# Patient Record
(demographics unavailable — no encounter records)

---

## 2025-02-01 NOTE — DISCUSSION/SUMMARY
[FreeTextEntry1] : Pt brought in due to attention concerns  school brought up that he has inattention, trouble completing tasks mom notices this at home as well vanderbuilts given for mom and teacher to complete ref to Neuro f/u prn

## 2025-02-11 NOTE — ASSESSMENT
[FreeTextEntry1] :  ES is a 10 year old male presenting for initial evaluation of inattention/hyperactivity   ES is in a general education 5th grade classroom setting with no services. Academically, borderline in all subjects. Concerns with inattention, need for prompting and fidgeting. Concerns for staring episodes. Non focal neurological exam. [place] forms reviewed, consistent with ADHD- Inattentive type. Will order EEG r/o seizures due to staring episodes.

## 2025-02-11 NOTE — PHYSICAL EXAM
[Well-appearing] : well-appearing [Normocephalic] : normocephalic [No dysmorphic facial features] : no dysmorphic facial features [Neck supple] : neck supple [Straight] : straight [No deformities] : no deformities [Alert] : alert [Well related, good eye contact] : well related, good eye contact [Conversant] : conversant [Normal speech and language] : normal speech and language [Follows instructions well] : follows instructions well [VFF] : VFF [Pupils reactive to light and accommodation] : pupils reactive to light and accommodation [Full extraocular movements] : full extraocular movements [Normal facial sensation to light touch] : normal facial sensation to light touch [No facial asymmetry or weakness] : no facial asymmetry or weakness [Gross hearing intact] : gross hearing intact [Equal palate elevation] : equal palate elevation [Good shoulder shrug] : good shoulder shrug [Normal tongue movement] : normal tongue movement [Midline tongue, no fasciculations] : midline tongue, no fasciculations [Normal axial and appendicular muscle tone] : normal axial and appendicular muscle tone [Gets up on table without difficulty] : gets up on table without difficulty [No pronator drift] : no pronator drift [Normal finger tapping and fine finger movements] : normal finger tapping and fine finger movements [No abnormal involuntary movements] : no abnormal involuntary movements [5/5 strength in proximal and distal muscles of arms and legs] : 5/5 strength in proximal and distal muscles of arms and legs [Walks and runs well] : walks and runs well [Able to do deep knee bend] : able to do deep knee bend Opt out [Able to walk on heels] : able to walk on heels [Able to walk on toes] : able to walk on toes [Knee jerks] : knee jerks [Localizes LT and temperature] : localizes LT and temperature [No dysmetria on FTNT] : no dysmetria on FTNT [Good walking balance] : good walking balance [Normal gait] : normal gait [Able to tandem well] : able to tandem well [Negative Romberg] : negative Romberg [de-identified] : Breathing even and unlabored

## 2025-02-11 NOTE — ASSESSMENT
[FreeTextEntry1] :  ES is a 10 year old male presenting for initial evaluation of inattention/hyperactivity   ES is in a general education 5th grade classroom setting with no services. Academically, borderline in all subjects. Concerns with inattention, need for prompting and fidgeting. Concerns for staring episodes. Non focal neurological exam. [place] forms reviewed, consistent with ADHD- Inattentive type. Will order EEG r/o seizures due to staring episodes.

## 2025-02-11 NOTE — HISTORY OF PRESENT ILLNESS
[FreeTextEntry1] : ES is a 10 year old male here for initial evaluation of inattention.   According to [facility], Es is forgetful, struggles to concentrate and has lack of focus. Es needs a lot of prompting and redirection to get work started or completed at school. He struggles to remain seated and when he sits he is constantly fidgeting. Academically, he is borderline in all subjects. No extra help services at this time. Es states at times he gets antsy when sitting an extended period of time.   Educational assessment:  Current Grade: 5th grade Current District: [facility]/ Current Accommodations/ICT: General education classroom setting, no services   Home assessment: He cannot get ready on his own, if he does, it takes him very long, [facility] notes " he is not very self efficient." He needs to be guided through his whole routine. He fidgets at meals at times but he can sit during meals. He can do homework independently, but he will engage in task avoidance or make excuses to leave the table and needs to be checked on. He needs reminders while doing tasks, single step works best. He will lose things often, will forget to bring his homework home or materials needed. Normal relationship with his siblings at home, he is the oldest. His younger siblings are 1 and 2 years old. Socially, he makes friends at school, no concerns. Es enjoys playing tag or doing races with friends at school. He enjoys watching tv or playing games during free time. No concern for anxiety, depression, OCD. He is starting to become oppositional at home with grandparents. He will actively refuse to do things. No hitting or confrontational behavior. Bedtime: 9:30pm, he may still be up when mom comes home from work at 11:30, will pretend to be asleep.Staring episodes at school and home.    [employer] Forms Score Parent: ADD 8/9- (6/9) Hyperactivity 5/9- (6/9) ODD 0/8 - (4/8) Conduct Disorder 0/14 (3/14) Anxiety/depression- 0/7- (3/7)  Performance AVG:   Teacher:  ADD 6/9- (6/9) Hyperactivity 1/9- (6/9) ODD/ Conduct Disorder 0/10 - (4/10) Anxiety/depression- 0/7- (3/7)  Performance Avg 3, 1 in somewhat of a problem, one in problematic

## 2025-02-11 NOTE — HISTORY OF PRESENT ILLNESS
[FreeTextEntry1] : ES is a 10 year old male here for initial evaluation of inattention.   According to [facility], Es is forgetful, struggles to concentrate and has lack of focus. Es needs a lot of prompting and redirection to get work started or completed at school. He struggles to remain seated and when he sits he is constantly fidgeting. Academically, he is borderline in all subjects. No extra help services at this time. Es states at times he gets antsy when sitting an extended period of time.   Educational assessment:  Current Grade: 5th grade Current District: [facility]/ Current Accommodations/ICT: General education classroom setting, no services   Home assessment: He cannot get ready on his own, if he does, it takes him very long, [facility] notes " he is not very self efficient." He needs to be guided through his whole routine. He fidgets at meals at times but he can sit during meals. He can do homework independently, but he will engage in task avoidance or make excuses to leave the table and needs to be checked on. He needs reminders while doing tasks, single step works best. He will lose things often, will forget to bring his homework home or materials needed. Normal relationship with his siblings at home, he is the oldest. His younger siblings are 1 and 2 years old. Socially, he makes friends at school, no concerns. Es enjoys playing tag or doing races with friends at school. He enjoys watching tv or playing games during free time. No concern for anxiety, depression, OCD. He is starting to become oppositional at home with grandparents. He will actively refuse to do things. No hitting or confrontational behavior. Bedtime: 9:30pm, he may still be up when mom comes home from work at 11:30, will pretend to be asleep.Staring episodes at school and home.    [employer] Forms Score Parent: ADD 8/9- (6/9) Hyperactivity 5/9- (6/9) ODD 0/8 - (4/8) Conduct Disorder 0/14 (3/14) Anxiety/depression- 0/7- (3/7)  Performance AVG:   Teacher:  ADD 6/9- (6/9) Hyperactivity 1/9- (6/9) ODD/ Conduct Disorder 0/10 - (4/10) Anxiety/depression- 0/7- (3/7)  Performance Avg 3, 1 in somewhat of a problem, one in problematic

## 2025-02-11 NOTE — BIRTH HISTORY
[At Term] : at term [place] [Normal Vaginal Route] : by normal vaginal route [None] : there were no delivery complications [Age Appropriate] : age appropriate developmental milestones met [de-identified] : 37 weeks [FreeTextEntry3] : walked at 14 months

## 2025-02-11 NOTE — BIRTH HISTORY
[At Term] : at term [place] [Normal Vaginal Route] : by normal vaginal route [None] : there were no delivery complications [Age Appropriate] : age appropriate developmental milestones met [de-identified] : 37 weeks [FreeTextEntry3] : walked at 14 months

## 2025-02-11 NOTE — PLAN
[FreeTextEntry1] : [ ] Accommodations letter provided  [ ] Discussed use of Omega 3 fish oil [ ] Routine EEg r/o seizures [ ]Follow up in 6 months, call for result of EEG once completed

## 2025-02-11 NOTE — PHYSICAL EXAM
[Well-appearing] : well-appearing [Normocephalic] : normocephalic [No dysmorphic facial features] : no dysmorphic facial features [Neck supple] : neck supple [Straight] : straight [No deformities] : no deformities [Alert] : alert [Well related, good eye contact] : well related, good eye contact [Conversant] : conversant [Normal speech and language] : normal speech and language [Follows instructions well] : follows instructions well [VFF] : VFF [Pupils reactive to light and accommodation] : pupils reactive to light and accommodation [Full extraocular movements] : full extraocular movements [Normal facial sensation to light touch] : normal facial sensation to light touch [No facial asymmetry or weakness] : no facial asymmetry or weakness [Gross hearing intact] : gross hearing intact [Equal palate elevation] : equal palate elevation [Good shoulder shrug] : good shoulder shrug [Normal tongue movement] : normal tongue movement [Midline tongue, no fasciculations] : midline tongue, no fasciculations [Normal axial and appendicular muscle tone] : normal axial and appendicular muscle tone [Gets up on table without difficulty] : gets up on table without difficulty [No pronator drift] : no pronator drift [Normal finger tapping and fine finger movements] : normal finger tapping and fine finger movements [No abnormal involuntary movements] : no abnormal involuntary movements [5/5 strength in proximal and distal muscles of arms and legs] : 5/5 strength in proximal and distal muscles of arms and legs [Walks and runs well] : walks and runs well [Able to do deep knee bend] : able to do deep knee bend [Able to walk on heels] : able to walk on heels [Able to walk on toes] : able to walk on toes [Knee jerks] : knee jerks [Localizes LT and temperature] : localizes LT and temperature [No dysmetria on FTNT] : no dysmetria on FTNT [Good walking balance] : good walking balance [Normal gait] : normal gait [Able to tandem well] : able to tandem well [Negative Romberg] : negative Romberg [de-identified] : Breathing even and unlabored

## 2025-03-20 NOTE — HISTORY OF PRESENT ILLNESS
[Mother] : mother [Eats healthy meals and snacks] : eats healthy meals and snacks [Eats meals with family] : eats meals with family [Normal] : Normal [No] : No cigarette smoke exposure [FreeTextEntry7] : went to Neuro dx with inattentive ADHD, will be receiving a 504 plan for school